# Patient Record
Sex: MALE | Race: WHITE | NOT HISPANIC OR LATINO | URBAN - METROPOLITAN AREA
[De-identification: names, ages, dates, MRNs, and addresses within clinical notes are randomized per-mention and may not be internally consistent; named-entity substitution may affect disease eponyms.]

---

## 2017-04-17 ENCOUNTER — OFFICE VISIT - HEALTHEAST (OUTPATIENT)
Dept: FAMILY MEDICINE | Facility: CLINIC | Age: 32
End: 2017-04-17

## 2017-04-17 ENCOUNTER — COMMUNICATION - HEALTHEAST (OUTPATIENT)
Dept: TELEHEALTH | Facility: CLINIC | Age: 32
End: 2017-04-17

## 2017-04-17 DIAGNOSIS — E55.9 VITAMIN D DEFICIENCY: ICD-10-CM

## 2017-04-17 DIAGNOSIS — G43.909 MIGRAINE: ICD-10-CM

## 2017-04-17 DIAGNOSIS — Z72.0 CHEWING TOBACCO USE: ICD-10-CM

## 2017-04-17 DIAGNOSIS — S39.012A BACK STRAIN: ICD-10-CM

## 2017-04-17 DIAGNOSIS — Z00.00 ANNUAL PHYSICAL EXAM: ICD-10-CM

## 2017-04-17 DIAGNOSIS — F41.1 ANXIETY STATE: ICD-10-CM

## 2017-04-17 ASSESSMENT — MIFFLIN-ST. JEOR: SCORE: 2001.28

## 2017-04-18 LAB
CHOLEST SERPL-MCNC: 131 MG/DL
FASTING STATUS PATIENT QL REPORTED: NORMAL
HDLC SERPL-MCNC: 46 MG/DL
LDLC SERPL CALC-MCNC: 61 MG/DL
TRIGL SERPL-MCNC: 121 MG/DL

## 2017-06-30 ENCOUNTER — COMMUNICATION - HEALTHEAST (OUTPATIENT)
Dept: FAMILY MEDICINE | Facility: CLINIC | Age: 32
End: 2017-06-30

## 2017-06-30 DIAGNOSIS — F41.1 ANXIETY STATE: ICD-10-CM

## 2017-07-11 ENCOUNTER — COMMUNICATION - HEALTHEAST (OUTPATIENT)
Dept: FAMILY MEDICINE | Facility: CLINIC | Age: 32
End: 2017-07-11

## 2017-07-13 ENCOUNTER — OFFICE VISIT - HEALTHEAST (OUTPATIENT)
Dept: FAMILY MEDICINE | Facility: CLINIC | Age: 32
End: 2017-07-13

## 2017-07-13 DIAGNOSIS — F41.1 ANXIETY STATE: ICD-10-CM

## 2017-07-13 RX ORDER — CYCLOBENZAPRINE HCL 10 MG
10 TABLET ORAL 3 TIMES DAILY PRN
Qty: 90 TABLET | Refills: 0 | Status: SHIPPED | OUTPATIENT
Start: 2017-07-13

## 2017-07-13 ASSESSMENT — MIFFLIN-ST. JEOR: SCORE: 1974.06

## 2017-07-14 ENCOUNTER — AMBULATORY - HEALTHEAST (OUTPATIENT)
Dept: FAMILY MEDICINE | Facility: CLINIC | Age: 32
End: 2017-07-14

## 2017-09-13 ENCOUNTER — COMMUNICATION - HEALTHEAST (OUTPATIENT)
Dept: FAMILY MEDICINE | Facility: CLINIC | Age: 32
End: 2017-09-13

## 2017-09-13 DIAGNOSIS — G43.909 MIGRAINE: ICD-10-CM

## 2017-09-14 RX ORDER — SUMATRIPTAN 50 MG/1
TABLET, FILM COATED ORAL
Qty: 10 TABLET | Refills: 0 | Status: SHIPPED | OUTPATIENT
Start: 2017-09-14

## 2017-10-03 ENCOUNTER — OFFICE VISIT - HEALTHEAST (OUTPATIENT)
Dept: FAMILY MEDICINE | Facility: CLINIC | Age: 32
End: 2017-10-03

## 2017-10-03 DIAGNOSIS — F41.0 PANIC DISORDER WITHOUT AGORAPHOBIA: ICD-10-CM

## 2017-10-03 DIAGNOSIS — F41.1 ANXIETY STATE: ICD-10-CM

## 2017-10-03 DIAGNOSIS — Z00.00 PREVENTATIVE HEALTH CARE: ICD-10-CM

## 2017-10-03 DIAGNOSIS — G43.909 MIGRAINE: ICD-10-CM

## 2017-10-03 DIAGNOSIS — G47.26 SHIFT WORK SLEEP DISORDER: ICD-10-CM

## 2017-12-16 ENCOUNTER — COMMUNICATION - HEALTHEAST (OUTPATIENT)
Dept: FAMILY MEDICINE | Facility: CLINIC | Age: 32
End: 2017-12-16

## 2017-12-16 DIAGNOSIS — F41.9 ANXIETY: ICD-10-CM

## 2017-12-19 RX ORDER — FLUOXETINE 40 MG/1
CAPSULE ORAL
Qty: 90 CAPSULE | Refills: 1 | Status: SHIPPED | OUTPATIENT
Start: 2017-12-19

## 2018-02-26 ENCOUNTER — COMMUNICATION - HEALTHEAST (OUTPATIENT)
Dept: FAMILY MEDICINE | Facility: CLINIC | Age: 33
End: 2018-02-26

## 2018-02-26 DIAGNOSIS — G47.26 SHIFT WORK SLEEP DISORDER: ICD-10-CM

## 2018-02-26 RX ORDER — HYDROXYZINE PAMOATE 25 MG/1
25-50 CAPSULE ORAL
Qty: 60 CAPSULE | Refills: 0 | Status: SHIPPED | OUTPATIENT
Start: 2018-02-26

## 2021-05-30 VITALS — BODY MASS INDEX: 35.62 KG/M2 | HEIGHT: 69 IN | WEIGHT: 240.5 LBS

## 2021-05-31 VITALS — BODY MASS INDEX: 36.52 KG/M2 | WEIGHT: 240.2 LBS

## 2021-05-31 VITALS — BODY MASS INDEX: 36.07 KG/M2 | HEIGHT: 68 IN | WEIGHT: 238 LBS

## 2021-06-10 NOTE — PROGRESS NOTES
Assessment:      Healthy male exam.    1. Annual physical exam  Lipid [place] RANDOM    Comprehensive Metabolic Panel    Vitamin D, Total (25-Hydroxy)   2. Anxiety  FLUoxetine (PROZAC) 10 MG tablet   3. Migraine  SUMAtriptan (IMITREX) 50 MG tablet   4. Back strain  cyclobenzaprine (FLEXERIL) 10 MG tablet   5. Chewing tobacco use     6. Vitamin D deficiency  ergocalciferol (ERGOCALCIFEROL) 50,000 unit capsule     The following high BMI interventions were performed this visit: encouragement to exercise, weight monitoring and lifestyle education regarding diet  I have counseled the patient for tobacco cessation and the follow up will occur  at the next visit.         Plan:       Blood tests:  See above mentioned lab work. I will get back to him by my chart and only call with grossly abnormal values.   Patient was found to be vitamin D deficient and was treated.   Patient was started on Prozac 10 mg for his anxiety and mood swings.   Imitrex and Flexeril were refilled.  Discussed healthy lifestyle modifications.  Follow up: Return in about 3 months (around 7/17/2017) for Next scheduled follow up.     Subjective:      Suman Carmona is a 32 y.o. male who presents for an annual exam. The patient reports that there is not domestic violence in his life.  Patient has not been to clinic since February 2014. He complains of anxiety and mood swings. He says he flips a switch very easily and has anger issues. He feels that he is compromising his relationships with wife and children. He tells me he needs to get this under control or his wife may choose to leave him. He denies any physical abuse. Patient was given a LAKSHMI seven and scored 18. He was given a PHQ nine and scored 15. With the severity of these conditions, I think an SSRI is necessary. We discuss medications and eventually decide upon fluoxetine. He has a history of migraines and needs a refill of Imitrex which he was given many years ago. He also has some mid back pain  and we discuss using a small amount of Flexeril, particularly just before bedtime. He has some reflux which he takes over-the-counter medication for. He admits to using chewing tobacco since he was a teenager. He wants quit for somewhere between one and two years. He currently uses two cans per week. We talk about his need to quit. He is open to having some screening lab work done. He had his flu shot in November 2016. He is unsure of when his last tetanus was. He does not take vitamin D. Patient's medical, surgical, family, and social histories were taken and documented into his chart.    Healthy Habits:   Regular Exercise: Yes,walks with dog/kids, some running in good weather, lifts wts some  Sunscreen Use: Yes  Healthy Diet: Yes, wife health consciencious  Dental Visits Regularly: No  Seat Belt: Yes  Sexually active: Yes  Monthly Self Testicular Exams:  No  Hemoccults: No  Flex Sig: No  Colonoscopy: No  Lipid Profile: Yes  Glucose Screen: Yes  Prevention of Osteoporosis: No  Last Dexa: No  Guns at Home:  N/A        There is no immunization history on file for this patient.  Immunization status: stated as current, but no records available.    No exam data present     Current Outpatient Prescriptions   Medication Sig Dispense Refill     cyclobenzaprine (FLEXERIL) 10 MG tablet Take 1 tablet (10 mg total) by mouth at bedtime as needed for muscle spasms. 30 tablet 2     ergocalciferol (ERGOCALCIFEROL) 50,000 unit capsule Take 1 capsule (50,000 Units total) by mouth once a week for 13 doses. 13 capsule 0     FLUoxetine (PROZAC) 10 MG tablet Take 1 tablet (10 mg total) by mouth daily. 30 tablet 2     SUMAtriptan (IMITREX) 50 MG tablet Take 1 tablet (50 mg total) by mouth every 2 (two) hours as needed for migraine. 10 tablet 0     No current facility-administered medications for this visit.      Past Medical History:   Diagnosis Date     Anxiety      Past Surgical History:   Procedure Laterality Date     WISDOM TOOTH  "EXTRACTION       Review of patient's allergies indicates no known allergies.  Family History   Problem Relation Age of Onset     Diabetes type II Mother      Hypertension Mother      Hyperlipidemia Mother      Obesity Mother      Prostate cancer Father      Skin cancer Father      Diabetes type II Maternal Grandmother      Diabetes type II Maternal Grandfather      Coronary artery disease Paternal Grandfather      Social History     Social History     Marital status:      Spouse name: Britany     Number of children: 2     Years of education: 16     Occupational History           Social History Main Topics     Smoking status: Never Smoker     Smokeless tobacco: Current User     Types: Chew     Alcohol use Yes      Comment: 12-15 beers/week     Drug use: No     Sexual activity: Yes     Partners: Female     Other Topics Concern     Not on file     Social History Narrative     No narrative on file       Review of Systems  Review of Systems   General ROS: positive for  - sleep disturbance  Psychological ROS: positive for - anxiety, depression, irritability and mood swings  Ophthalmic ROS: negative  ENT ROS: negative  Allergy and Immunology ROS: negative  Hematological and Lymphatic ROS: negative  Endocrine ROS: negative  Breast ROS: negative  Respiratory ROS: negative  Cardiovascular ROS: positive for - palpitations and rapid heart rate  Gastrointestinal ROS: positive for - heartburn  Genito-Urinary ROS: negative  Musculoskeletal ROS: negative  Neurological ROS: positive for - migraine headache  Dermatological ROS: negative          Objective:     Vitals:    04/17/17 1525   BP: 138/88   Pulse: 66   Temp: 97.9  F (36.6  C)   Weight: (!) 240 lb 8 oz (109.1 kg)   Height: 5' 9\" (1.753 m)     Body mass index is 35.52 kg/(m^2).    Physical  Physical Exam   Physical Examination: General appearance - alert, well appearing, and in no distress and obese  Mental status - normal mood, behavior, speech, dress, motor " activity, and thought processes  Eyes - pupils equal and reactive, extraocular eye movements intact, sclera anicteric  Ears - bilateral TM's and external ear canals normal  Nose - normal and patent, no erythema, discharge or polyps  Mouth - mucous membranes moist, pharynx normal without lesions  Neck - supple, no significant adenopathy, carotids upstroke normal bilaterally, no bruits, thyroid exam: thyroid is normal in size without nodules or tenderness  Lymphatics - no palpable lymphadenopathy, no hepatosplenomegaly  Chest - clear to auscultation, no wheezes, rales or rhonchi, symmetric air entry  Heart - normal rate and regular rhythm, S1 and S2 normal, no murmurs noted  Abdomen - soft, nontender, nondistended, no masses or organomegaly   Male - no penile lesions or discharge, no testicular masses or tenderness, no hernias  Back exam - full range of motion, no tenderness, palpable spasm or pain on motion  Neurological - alert, oriented, normal speech, no focal findings or movement disorder noted, cranial nerves II through XII intact, DTR's normal and symmetric  Musculoskeletal - no joint tenderness, deformity or swelling  Extremities - peripheral pulses normal, no pedal edema, no clubbing or cyanosis  Skin - normal coloration and turgor, no rashes, no suspicious skin lesions noted

## 2021-06-11 NOTE — PROGRESS NOTES
"S:  32 yom presenting today to discuss anxiety    Anxiety:  Says fluoxetine is not doing enough, only noticed mild improvement in that his palpitations seem better.  Has been on this for 3 months, taking 10 mg daily  Main issues are irritability, heart starts racing and he gets flushed/sweaty when he gets worked up.  He works as a  and notices that he loses his temper, has tried deep breathing but this does not help  Having a hard time with the kids as well, loses his temper and always feels irritable.  He works overnight shifts and sleep is a major issue for him- got home today at 3 in the morning and could not fall asleep because of worrying and racing thoughts about work.  He does drink a lot of caffeine to stay awake while he is working, had a cup of coffee around 1 am which is part of his usual work routine.  This is the only SSRI he has ever tried, no prior med use.    States he has been \"wired\" this way his whole life, always worrying and feeling anxious- things seemed to have worsened  Over the last year or so   Family history:  Mom and sibling have anxiety, he is not sure if they are on any medicines      Patient Active Problem List   Diagnosis     Panic Disorder Without Agoraphobia     Palpitations     Abnormal Glucose     Anxiety     Migraine Headache     Back strain     Chewing tobacco use       Current Outpatient Prescriptions on File Prior to Visit   Medication Sig Dispense Refill     ergocalciferol (ERGOCALCIFEROL) 50,000 unit capsule Take 1 capsule (50,000 Units total) by mouth once a week for 13 doses. 13 capsule 0     FLUoxetine (PROZAC) 10 MG tablet TAKE 1 TABLET BY MOUTH ONCE DAILY 90 tablet 1     SUMAtriptan (IMITREX) 50 MG tablet Take 1 tablet (50 mg total) by mouth every 2 (two) hours as needed for migraine. 10 tablet 0     No current facility-administered medications on file prior to visit.          O:  Vitals:    07/13/17 1500   BP: (!) 154/100   Pulse: 82     Gen alert pleasant " NAD  Heart rrr no murmurs  Lungs clear without wheezing or crackles  Psyc mood and affect appropriate    A/P:  Anxiety:  Discussed options with patient-  Does report some improvement of anxiety symptoms (states that sensation of heart racing has improved) and he is on a very low dose with lots of room to titrate up on Prozac vs changing medications.  Patient agrees to continue prozac with plan to titrate up.  I do think his shift work and lack of sleep are contributing to his mood which he does agree with, says it is hard for him to sleep after his night shifts because he worries and his thoughts race.    -Increase to 20 mg daily, if feeling well without side effects after 2-3 weeks can increase to 40 mg daily  -For sleep, will try vistaril 25-50 mg before bed- instructed patient that this will make him drowsy so do not take with alcohol, do not take with flexeril  -F/u here in 2-4 weeks to check in, sooner if needed  -Patient did mention feeling flushed/sweaty and heart racing, in combination with his elevated blood pressure can all be from anxiety, however pheochromocytoma is on the differential- it is unlikely given how rare it is, but work-up could be considered if symptoms continue or worsen despite above measures    Elevated blood pressure:  Patient attributes this to feeling anxious and not sleeping after his overnight shift, he left before this could be rechecked but I did discuss how this will have to be monitored at his follow up visit    Return in 2-4 weeks for mood f/u and recheck BP      Yesi High MD

## 2021-06-13 NOTE — PROGRESS NOTES
SUBJECTIVE: Suman Carmona is a 32 y.o.   male who presents today  For follow-up of his anxiety treatment. When I saw him last, In April, I started him on low dose fluoxetine to see if he tolerated it. He was to follow up with me but instead he saw Dr.Tara High on 7/13. He was not well controlled at that time and so she told him to increase his fluoxetine to 20 mg and when he was tolerating that he could even go up to a full 40 mg. She also gave him Vistaril to use at bedtime For sleep. He had elevated blood pressure 154 over hundred at that time but it is improved today.  He is currently on 40 mg  fluoxetine and tells me he's doing better. He was given a PHQ nine and scored 4. He was given a LAKSHMI seven and scored three. He feels this is adequate for him. He wants a refill of the hydroxyzine. He continues to chew at work as there are many others at his work that do it and he finds it difficult to resist. He does this for a kick because he works various shifts and this disrupts his sleep. He does go to a dentist two times a year and they are watching for any side effects. We discussed quitting. He does experience intermittent migraine headache but it has improved with treatment from every week or two to maybe two months. He does tell me that his five-year-old daughter has some tics that he is concerned about. He has a three-year-old boy as well and he takes both of them swimming and to the gym.    OBJECTIVE: /86  Pulse 78  Wt (!) 240 lb 3.2 oz (109 kg)  BMI 36.52 kg/m2  General:  Obese young male in no acute distress  Heart:  Regular rate and rhythm without  Lungs:  Clear bilaterally  Abdomen:  Soft, nontender  Extremities:  Warm, dry and without edema   psych: [place]    ASSESSMENT & PLAN:    1. Anxiety     2. Panic disorder without agoraphobia     3. Shift work sleep disorder  hydrOXYzine (VISTARIL) 25 MG capsule   4. Migraine     5. Preventative health care  Influenza, Seasonal,Quad Inj, 36+  MOS      He is going to continue with 40 mg of fluoxetine. I refilled his hydroxyzine. He received the flu shot today. He should follow up in six months for his annual physical.    Patient Active Problem List   Diagnosis     Panic Disorder Without Agoraphobia     Palpitations     Abnormal Glucose     Anxiety     Migraine Headache     Back strain     Chewing tobacco use     Shift work sleep disorder       Current Outpatient Prescriptions on File Prior to Visit   Medication Sig Dispense Refill     cyclobenzaprine (FLEXERIL) 10 MG tablet Take 1 tablet (10 mg total) by mouth 3 (three) times a day as needed for muscle spasms. 90 tablet 0     FLUoxetine (PROZAC) 40 MG capsule Take 1 capsule (40 mg total) by mouth daily. 90 capsule 0     SUMAtriptan (IMITREX) 50 MG tablet TAKE 1 TABLET BY MOUTH EVERY 2 HOURS AS NEEDED FOR MIGRAINE 10 tablet 0     No current facility-administered medications on file prior to visit.

## 2021-06-15 PROBLEM — Z72.0 CHEWING TOBACCO USE: Status: ACTIVE | Noted: 2017-04-20

## 2021-06-16 PROBLEM — G47.26 SHIFT WORK SLEEP DISORDER: Status: ACTIVE | Noted: 2017-10-03

## 2021-07-03 NOTE — ADDENDUM NOTE
Addendum Note by Lizeth Flores MD at 9/14/2017 11:27 AM     Author: Lizeth Flores MD Service: -- Author Type: Physician    Filed: 9/14/2017 11:27 AM Encounter Date: 9/13/2017 Status: Signed    : Lizeth Flores MD (Physician)    Addended by: LIZETH FLORES on: 9/14/2017 11:27 AM        Modules accepted: Orders